# Patient Record
Sex: MALE | Race: WHITE | Employment: OTHER | URBAN - METROPOLITAN AREA
[De-identification: names, ages, dates, MRNs, and addresses within clinical notes are randomized per-mention and may not be internally consistent; named-entity substitution may affect disease eponyms.]

---

## 2021-12-25 ENCOUNTER — HOSPITAL ENCOUNTER (EMERGENCY)
Age: 71
Discharge: HOME | End: 2021-12-26
Attending: EMERGENCY MEDICINE
Payer: COMMERCIAL

## 2021-12-25 DIAGNOSIS — N17.9 ACUTE RENAL FAILURE SUPERIMPOSED ON CHRONIC KIDNEY DISEASE, UNSPECIFIED CKD STAGE, UNSPECIFIED ACUTE RENAL FAILURE TYPE (HCC): ICD-10-CM

## 2021-12-25 DIAGNOSIS — M79.10 MYALGIA: ICD-10-CM

## 2021-12-25 DIAGNOSIS — E87.6 HYPOKALEMIA: ICD-10-CM

## 2021-12-25 DIAGNOSIS — R19.7 DIARRHEA, UNSPECIFIED TYPE: Primary | ICD-10-CM

## 2021-12-25 DIAGNOSIS — R11.0 NAUSEA: ICD-10-CM

## 2021-12-25 DIAGNOSIS — E86.0 DEHYDRATION: ICD-10-CM

## 2021-12-25 DIAGNOSIS — N18.9 ACUTE RENAL FAILURE SUPERIMPOSED ON CHRONIC KIDNEY DISEASE, UNSPECIFIED CKD STAGE, UNSPECIFIED ACUTE RENAL FAILURE TYPE (HCC): ICD-10-CM

## 2021-12-25 LAB
-: NORMAL
ALBUMIN SERPL-MCNC: 4.3 G/DL (ref 3.5–5.2)
ALBUMIN/GLOBULIN RATIO: 1.6 (ref 1–2.5)
ALP BLD-CCNC: 81 U/L (ref 40–129)
ALT SERPL-CCNC: 23 U/L (ref 5–41)
ANION GAP SERPL CALCULATED.3IONS-SCNC: 15 MMOL/L (ref 9–17)
AST SERPL-CCNC: 31 U/L
BILIRUB SERPL-MCNC: 0.72 MG/DL (ref 0.3–1.2)
BUN BLDV-MCNC: 28 MG/DL (ref 8–23)
BUN/CREAT BLD: ABNORMAL (ref 9–20)
CALCIUM SERPL-MCNC: 9.2 MG/DL (ref 8.6–10.4)
CHLORIDE BLD-SCNC: 99 MMOL/L (ref 98–107)
CO2: 22 MMOL/L (ref 20–31)
CREAT SERPL-MCNC: 1.56 MG/DL (ref 0.7–1.2)
DIRECT EXAM: NORMAL
GFR AFRICAN AMERICAN: 53 ML/MIN
GFR NON-AFRICAN AMERICAN: 44 ML/MIN
GFR SERPL CREATININE-BSD FRML MDRD: ABNORMAL ML/MIN/{1.73_M2}
GFR SERPL CREATININE-BSD FRML MDRD: ABNORMAL ML/MIN/{1.73_M2}
GLUCOSE BLD-MCNC: 153 MG/DL (ref 70–99)
LIPASE: 15 U/L (ref 13–60)
Lab: NORMAL
MAGNESIUM: 1.6 MG/DL (ref 1.6–2.6)
POTASSIUM SERPL-SCNC: 3 MMOL/L (ref 3.7–5.3)
REASON FOR REJECTION: NORMAL
SODIUM BLD-SCNC: 136 MMOL/L (ref 135–144)
SPECIMEN DESCRIPTION: NORMAL
TOTAL PROTEIN: 7 G/DL (ref 6.4–8.3)
TROPONIN INTERP: ABNORMAL
TROPONIN T: ABNORMAL NG/ML
TROPONIN, HIGH SENSITIVITY: 33 NG/L (ref 0–22)
ZZ NTE CLEAN UP: ORDERED TEST: NORMAL
ZZ NTE WITH NAME CLEAN UP: SPECIMEN SOURCE: NORMAL

## 2021-12-25 PROCEDURE — 83690 ASSAY OF LIPASE: CPT

## 2021-12-25 PROCEDURE — 96365 THER/PROPH/DIAG IV INF INIT: CPT

## 2021-12-25 PROCEDURE — 6360000002 HC RX W HCPCS: Performed by: EMERGENCY MEDICINE

## 2021-12-25 PROCEDURE — 85025 COMPLETE CBC W/AUTO DIFF WBC: CPT

## 2021-12-25 PROCEDURE — 84484 ASSAY OF TROPONIN QUANT: CPT

## 2021-12-25 PROCEDURE — 93005 ELECTROCARDIOGRAM TRACING: CPT | Performed by: EMERGENCY MEDICINE

## 2021-12-25 PROCEDURE — 83735 ASSAY OF MAGNESIUM: CPT

## 2021-12-25 PROCEDURE — 87804 INFLUENZA ASSAY W/OPTIC: CPT

## 2021-12-25 PROCEDURE — 80053 COMPREHEN METABOLIC PANEL: CPT

## 2021-12-25 PROCEDURE — 99283 EMERGENCY DEPT VISIT LOW MDM: CPT

## 2021-12-25 PROCEDURE — 36415 COLL VENOUS BLD VENIPUNCTURE: CPT

## 2021-12-25 PROCEDURE — U0005 INFEC AGEN DETEC AMPLI PROBE: HCPCS

## 2021-12-25 PROCEDURE — 96366 THER/PROPH/DIAG IV INF ADDON: CPT

## 2021-12-25 PROCEDURE — U0003 INFECTIOUS AGENT DETECTION BY NUCLEIC ACID (DNA OR RNA); SEVERE ACUTE RESPIRATORY SYNDROME CORONAVIRUS 2 (SARS-COV-2) (CORONAVIRUS DISEASE [COVID-19]), AMPLIFIED PROBE TECHNIQUE, MAKING USE OF HIGH THROUGHPUT TECHNOLOGIES AS DESCRIBED BY CMS-2020-01-R: HCPCS

## 2021-12-25 PROCEDURE — 2580000003 HC RX 258: Performed by: EMERGENCY MEDICINE

## 2021-12-25 PROCEDURE — 96375 TX/PRO/DX INJ NEW DRUG ADDON: CPT

## 2021-12-25 RX ORDER — ONDANSETRON 2 MG/ML
4 INJECTION INTRAMUSCULAR; INTRAVENOUS ONCE
Status: COMPLETED | OUTPATIENT
Start: 2021-12-25 | End: 2021-12-25

## 2021-12-25 RX ORDER — KETOROLAC TROMETHAMINE 15 MG/ML
15 INJECTION, SOLUTION INTRAMUSCULAR; INTRAVENOUS ONCE
Status: COMPLETED | OUTPATIENT
Start: 2021-12-25 | End: 2021-12-25

## 2021-12-25 RX ORDER — HYDROCHLOROTHIAZIDE 25 MG/1
25 TABLET ORAL DAILY
COMMUNITY

## 2021-12-25 RX ORDER — ASPIRIN 81 MG/1
81 TABLET, CHEWABLE ORAL DAILY
COMMUNITY

## 2021-12-25 RX ORDER — POTASSIUM CITRATE 10 MEQ/1
TABLET, EXTENDED RELEASE ORAL
COMMUNITY

## 2021-12-25 RX ORDER — METOPROLOL SUCCINATE 25 MG/1
25 TABLET, EXTENDED RELEASE ORAL DAILY
COMMUNITY

## 2021-12-25 RX ORDER — ATORVASTATIN CALCIUM 40 MG/1
40 TABLET, FILM COATED ORAL DAILY
COMMUNITY

## 2021-12-25 RX ORDER — LOSARTAN POTASSIUM 100 MG/1
100 TABLET ORAL DAILY
COMMUNITY

## 2021-12-25 RX ORDER — MAGNESIUM SULFATE IN WATER 40 MG/ML
2000 INJECTION, SOLUTION INTRAVENOUS ONCE
Status: COMPLETED | OUTPATIENT
Start: 2021-12-25 | End: 2021-12-26

## 2021-12-25 RX ORDER — TADALAFIL 5 MG/1
5 TABLET ORAL PRN
COMMUNITY

## 2021-12-25 RX ORDER — 0.9 % SODIUM CHLORIDE 0.9 %
1000 INTRAVENOUS SOLUTION INTRAVENOUS ONCE
Status: COMPLETED | OUTPATIENT
Start: 2021-12-25 | End: 2021-12-26

## 2021-12-25 RX ADMIN — SODIUM CHLORIDE 1000 ML: 9 INJECTION, SOLUTION INTRAVENOUS at 23:30

## 2021-12-25 RX ADMIN — ONDANSETRON 4 MG: 2 INJECTION INTRAMUSCULAR; INTRAVENOUS at 23:30

## 2021-12-25 RX ADMIN — MAGNESIUM SULFATE 2000 MG: 2 INJECTION INTRAVENOUS at 23:49

## 2021-12-25 RX ADMIN — KETOROLAC TROMETHAMINE 15 MG: 15 INJECTION, SOLUTION INTRAMUSCULAR; INTRAVENOUS at 23:30

## 2021-12-25 ASSESSMENT — PAIN SCALES - GENERAL: PAINLEVEL_OUTOF10: 7

## 2021-12-26 VITALS
OXYGEN SATURATION: 95 % | SYSTOLIC BLOOD PRESSURE: 140 MMHG | RESPIRATION RATE: 18 BRPM | TEMPERATURE: 99.1 F | HEART RATE: 90 BPM | DIASTOLIC BLOOD PRESSURE: 95 MMHG

## 2021-12-26 LAB
ABSOLUTE EOS #: 0 K/UL (ref 0–0.4)
ABSOLUTE IMMATURE GRANULOCYTE: ABNORMAL K/UL (ref 0–0.3)
ABSOLUTE LYMPH #: 0.69 K/UL (ref 1–4.8)
ABSOLUTE MONO #: 0.77 K/UL (ref 0.1–0.8)
ANION GAP SERPL CALCULATED.3IONS-SCNC: 12 MMOL/L (ref 9–17)
BASOPHILS # BLD: 2 % (ref 0–2)
BASOPHILS ABSOLUTE: 0.15 K/UL (ref 0–0.2)
BUN BLDV-MCNC: 27 MG/DL (ref 8–23)
BUN/CREAT BLD: ABNORMAL (ref 9–20)
CALCIUM SERPL-MCNC: 8.4 MG/DL (ref 8.6–10.4)
CHLORIDE BLD-SCNC: 101 MMOL/L (ref 98–107)
CO2: 21 MMOL/L (ref 20–31)
CREAT SERPL-MCNC: 1.41 MG/DL (ref 0.7–1.2)
DIFFERENTIAL TYPE: ABNORMAL
EOSINOPHILS RELATIVE PERCENT: 0 % (ref 1–4)
GFR AFRICAN AMERICAN: >60 ML/MIN
GFR NON-AFRICAN AMERICAN: 50 ML/MIN
GFR SERPL CREATININE-BSD FRML MDRD: ABNORMAL ML/MIN/{1.73_M2}
GFR SERPL CREATININE-BSD FRML MDRD: ABNORMAL ML/MIN/{1.73_M2}
GLUCOSE BLD-MCNC: 148 MG/DL (ref 70–99)
HCT VFR BLD CALC: 43.8 % (ref 41–53)
HEMOGLOBIN: 15.3 G/DL (ref 13.5–17.5)
IMMATURE GRANULOCYTES: ABNORMAL %
LYMPHOCYTES # BLD: 9 % (ref 24–44)
MCH RBC QN AUTO: 31.6 PG (ref 26–34)
MCHC RBC AUTO-ENTMCNC: 34.8 G/DL (ref 31–37)
MCV RBC AUTO: 90.8 FL (ref 80–100)
MONOCYTES # BLD: 10 % (ref 1–7)
MORPHOLOGY: NORMAL
NRBC AUTOMATED: ABNORMAL PER 100 WBC
PDW BLD-RTO: 13.1 % (ref 12.5–15.4)
PLATELET # BLD: 211 K/UL (ref 140–450)
PLATELET ESTIMATE: ABNORMAL
PMV BLD AUTO: 8.3 FL (ref 6–12)
POTASSIUM SERPL-SCNC: 3.1 MMOL/L (ref 3.7–5.3)
RBC # BLD: 4.83 M/UL (ref 4.5–5.9)
RBC # BLD: ABNORMAL 10*6/UL
SEG NEUTROPHILS: 79 % (ref 36–66)
SEGMENTED NEUTROPHILS ABSOLUTE COUNT: 6.09 K/UL (ref 1.8–7.7)
SODIUM BLD-SCNC: 134 MMOL/L (ref 135–144)
TROPONIN INTERP: ABNORMAL
TROPONIN T: ABNORMAL NG/ML
TROPONIN, HIGH SENSITIVITY: 31 NG/L (ref 0–22)
WBC # BLD: 7.7 K/UL (ref 3.5–11)
WBC # BLD: ABNORMAL 10*3/UL

## 2021-12-26 PROCEDURE — 36415 COLL VENOUS BLD VENIPUNCTURE: CPT

## 2021-12-26 PROCEDURE — 84484 ASSAY OF TROPONIN QUANT: CPT

## 2021-12-26 PROCEDURE — 80048 BASIC METABOLIC PNL TOTAL CA: CPT

## 2021-12-26 PROCEDURE — 93005 ELECTROCARDIOGRAM TRACING: CPT | Performed by: EMERGENCY MEDICINE

## 2021-12-26 PROCEDURE — 6370000000 HC RX 637 (ALT 250 FOR IP): Performed by: EMERGENCY MEDICINE

## 2021-12-26 RX ORDER — POTASSIUM CHLORIDE 20 MEQ/1
40 TABLET, EXTENDED RELEASE ORAL ONCE
Status: COMPLETED | OUTPATIENT
Start: 2021-12-26 | End: 2021-12-26

## 2021-12-26 RX ORDER — ONDANSETRON 4 MG/1
4 TABLET, ORALLY DISINTEGRATING ORAL EVERY 8 HOURS PRN
Qty: 10 TABLET | Refills: 0 | Status: SHIPPED | OUTPATIENT
Start: 2021-12-26

## 2021-12-26 RX ADMIN — POTASSIUM CHLORIDE 40 MEQ: 1500 TABLET, EXTENDED RELEASE ORAL at 00:11

## 2021-12-26 RX ADMIN — POTASSIUM CHLORIDE 40 MEQ: 1500 TABLET, EXTENDED RELEASE ORAL at 02:14

## 2021-12-26 NOTE — ED PROVIDER NOTES
[id number] [facility], [facility] ED  [address] [address] [facility] [address] [facility] [address]  Phone: [phone]  Fax: [phone]      Pt Name: Beatrice Clark  [id number]  [place] 1950  Date of evaluation: 12/25/2021      CHIEF COMPLAINT       Chief Complaint   Patient presents with    Diarrhea     x2 days       HISTORY OF PRESENT ILLNESS   Beatrice Clark is a 70 y.o. male with history of CAD, hypertension, hyperlipidemia, prostate cancer status post prostate surgery in remission, knee replacement, and 2 back surgeries who presents for evaluation of flulike symptoms. The patient reports that starting yesterday afternoon he developed gradual onset, constant, progressive, fatigue, malaise, nausea, chills, myalgias, and nonbloody watery diarrhea with over 20 episodes. The patient has been taking Advil and Imodium without improvement. He does not list any provoking or palliating factors. He states that he has received his full Covid vaccination with a booster on 11/10/2021. He also received his flu vaccination in September 2021. The patient denies any sick contacts. He is currently visiting from out of town. He denies fever, chills, headache, vision changes, neck pain, back pain, chest pain, shortness of breath, cough, sore throat, focal weakness, numbness, tingling, vomiting, recent injury or illness. REVIEW OF SYSTEMS     Ten point review of systems was reviewed and is negative unless otherwise noted in the HPI    [address]    has a past medical history of CAD (coronary artery disease), Gall stones, H/O discectomy, H/O laminectomy, Heart attack ([address] History of knee replacement, Hyperlipidemia, Hypertension, Prostate cancer ([address] and Stenosis (acquired) of bladder neck or vesicourethral orifice. SURGICAL HISTORY      has a past surgical history that includes Prostate surgery; knee surgery; and laminectomy.     CURRENT MEDICATIONS       Previous Medications    ASPIRIN 81 MG CHEWABLE TABLET    Take 81 mg by mouth daily    ATORVASTATIN (LIPITOR) 40 MG TABLET    Take 40 mg by mouth daily    HYDROCHLOROTHIAZIDE (HYDRODIURIL) 25 MG TABLET    Take 25 mg by mouth daily    LOSARTAN (COZAAR) 100 MG TABLET    Take 100 mg by mouth daily    METOPROLOL SUCCINATE (TOPROL XL) 25 MG EXTENDED RELEASE TABLET    Take 25 mg by mouth daily    POTASSIUM CITRATE (UROCIT-K) 10 MEQ (1080 MG) EXTENDED RELEASE TABLET    Take by mouth 3 times daily (with meals)    TADALAFIL (CIALIS) 5 MG TABLET    Take 5 mg by mouth as needed for Erectile Dysfunction       ALLERGIES     has No Known Allergies. FAMILY HISTORY     has no family status information on file. family history is not on file. SOCIAL HISTORY      reports that he has never smoked. He has never used smokeless tobacco. He reports that he does not drink alcohol. PHYSICAL EXAM     INITIAL VITALS:  oral temperature is 99.1 °F (37.3 °C). His blood pressure is 140/95 (abnormal) and his pulse is 90. His respiration is 18 and oxygen saturation is 95%. CONSTITUTIONAL: no apparent distress, well appearing  SKIN: warm, dry, no jaundice, hives or petechiae  EYES: clear conjunctiva, non-icteric sclera  HENT: normocephalic, atraumatic, dry mucus membranes, Posterior oropharynx is clear without any erythema, edema, exudate or asymmetry. Uvula midline. No trismus or malocclusion. No pain to palpation over the frontal or maxillary sinuses. No mastoid tenderness. Able to handle secretions. Normal speech. Patent airway. No submental swelling or evidence of cellulitis. NECK: Nontender and supple with no nuchal rigidity, full range of motion  PULMONARY: clear to auscultation without wheezes, rhonchi, or rales, normal excursion, no accessory muscle use and no stridor  CARDIOVASCULAR: regular rate, rhythm. Strong radial pulses with intact distal perfusion. Capillary refill <2 seconds.   GASTROINTESTINAL: soft, non-tender, no pain or McBurney's point, negative Johnson sign, no mg/dL    Bun/Cre Ratio NOT REPORTED 9 - 20    Calcium 9.2 8.6 - 10.4 mg/dL    Sodium 136 135 - 144 mmol/L    Potassium 3.0 (L) 3.7 - 5.3 mmol/L    Chloride 99 98 - 107 mmol/L    CO2 22 20 - 31 mmol/L    Anion Gap 15 9 - 17 mmol/L    Alkaline Phosphatase 81 40 - 129 U/L    ALT 23 5 - 41 U/L    AST 31 <40 U/L    Total Bilirubin 0.72 0.3 - 1.2 mg/dL    Total Protein 7.0 6.4 - 8.3 g/dL    Albumin 4.3 3.5 - 5.2 g/dL    Albumin/Globulin Ratio 1.6 1.0 - 2.5    GFR Non- 44 (L) >60 mL/min    GFR  53 (L) >60 mL/min    GFR Comment          GFR Staging NOT REPORTED    Lipase   Result Value Ref Range    Lipase 15 13 - 60 U/L   Troponin   Result Value Ref Range    Troponin, High Sensitivity 33 (H) 0 - 22 ng/L    Troponin T NOT REPORTED <0.03 ng/mL    Troponin Interp NOT REPORTED    SPECIMEN REJECTION   Result Value Ref Range    Specimen Source . BLOOD     Ordered Test CDP     Reason for Rejection Unable to perform testing: Specimen clotted.      - NOT REPORTED    CBC Auto Differential   Result Value Ref Range    WBC 7.7 3.5 - 11.0 k/uL    RBC 4.83 4.5 - 5.9 m/uL    Hemoglobin 15.3 13.5 - 17.5 g/dL    Hematocrit 43.8 41 - 53 %    MCV 90.8 80 - 100 fL    MCH 31.6 26 - 34 pg    MCHC 34.8 31 - 37 g/dL    RDW 13.1 12.5 - 15.4 %    Platelets 331 108 - 331 k/uL    MPV 8.3 6.0 - 12.0 fL    NRBC Automated NOT REPORTED per 100 WBC    Differential Type NOT REPORTED     Immature Granulocytes NOT REPORTED 0 %    Absolute Immature Granulocyte NOT REPORTED 0.00 - 0.30 k/uL    WBC Morphology NOT REPORTED     RBC Morphology NOT REPORTED     Platelet Estimate NOT REPORTED     Seg Neutrophils 79 (H) 36 - 66 %    Lymphocytes 9 (L) 24 - 44 %    Monocytes 10 (H) 1 - 7 %    Eosinophils % 0 (L) 1 - 4 %    Basophils 2 0 - 2 %    Segs Absolute 6.09 1.8 - 7.7 k/uL    Absolute Lymph # 0.69 (L) 1.0 - 4.8 k/uL    Absolute Mono # 0.77 0.1 - 0.8 k/uL    Absolute Eos # 0.00 0.0 - 0.4 k/uL    Basophils Absolute 0.15 0.0 - 0.2 k/uL Morphology Normal    Magnesium   Result Value Ref Range    Magnesium 1.6 1.6 - 2.6 mg/dL   Troponin   Result Value Ref Range    Troponin, High Sensitivity 31 (H) 0 - 22 ng/L    Troponin T NOT REPORTED <0.03 ng/mL    Troponin Interp NOT REPORTED    Basic Metabolic Panel   Result Value Ref Range    Glucose 148 (H) 70 - 99 mg/dL    BUN 27 (H) 8 - 23 mg/dL    CREATININE 1.41 (H) 0.70 - 1.20 mg/dL    Bun/Cre Ratio NOT REPORTED 9 - 20    Calcium 8.4 (L) 8.6 - 10.4 mg/dL    Sodium 134 (L) 135 - 144 mmol/L    Potassium 3.1 (L) 3.7 - 5.3 mmol/L    Chloride 101 98 - 107 mmol/L    CO2 21 20 - 31 mmol/L    Anion Gap 12 9 - 17 mmol/L    GFR Non-African American 50 (L) >60 mL/min    GFR African American >60 >60 mL/min    GFR Comment          GFR Staging NOT REPORTED        EMERGENCY DEPARTMENT COURSE:        The patient was given the following medications:  Orders Placed This Encounter   Medications    0.9 % sodium chloride bolus    ketorolac (TORADOL) injection 15 mg    ondansetron (ZOFRAN) injection 4 mg    magnesium sulfate 2000 mg in 50 mL IVPB premix    potassium chloride (KLOR-CON M) extended release tablet 40 mEq    potassium chloride (KLOR-CON M) extended release tablet 40 mEq    ondansetron (ZOFRAN ODT) 4 MG disintegrating tablet     Sig: Take 1 tablet by mouth every 8 hours as needed for Nausea     Dispense:  10 tablet     Refill:  0        Vitals:    Vitals:    12/25/21 2222 12/26/21 0155   BP: (!) 140/95    Pulse: 107 90   Resp: 18    Temp: 99.1 °F (37.3 °C)    TempSrc: Oral    SpO2: 95%      -------------------------  BP: (!) 140/95, Temp: 99.1 °F (37.3 °C), Pulse: 90, Resp: 18    CONSULTS:  None    CRITICAL CARE:   None    PROCEDURES:  None    DIAGNOSIS/ MDM:   Jeri Weller is a 70 y.o. male who presents with nausea, diarrhea, and generalized myalgias. The patient arrives with slight tachycardia but I suspect this is secondary to dehydration. Mucous membranes are dry but exam is otherwise unremarkable.   He was treated with IV fluids and heart rate returned to normal.  Initial EKG showed prolonged QTc interval which was treated with 2 g of magnesium. On repeat EKG the QTc interval had returned to normal.  No ST elevations or depressions. He has never had any chest pain or shortness of breath. His symptoms improved with Toradol, Zofran and IV fluids. Initial troponin is elevated at 33 and he is from out of town and I do not have any previous troponins to compare to. Initial BMP reveals acute on chronic kidney injury with BUN of 28 and creatinine of 1.56. The patient also has hypokalemia of 3.0 which was replaced orally. CBC, LFTs and lipase are unremarkable. Rapid flu is negative. Covid was a send out. Repeat troponin trending down to 31. Repeat potassium was 3.1 he was given more oral potassium prior to discharge. I reevaluated the patient and he is feeling much improved. He is requesting discharge. I suspect his troponins were elevated secondary to his acute on chronic kidney injury. I was able to look up old labs and his baseline creatinine is 1.29. We discussed admission versus discharge and the patient would like to be discharged with follow-up with his primary care doctor and cardiologist in [facility]. I have low suspicion for ACS, PE, dissection, esophageal rupture, cardiac tamponade, pneumothorax, or sepsis. The patient was instructed to continually hydrate and to take Tylenol as needed for pain or fever. He was instructed to return to the ER for worsening symptoms or any other concern. The patient understands that at this time there is no evidence for a more malignant underlying process, but also understands that early in the process of an illness or injury, an emergency department work-up can be falsely reassuring.   Routine discharge counseling was given, and the patient understands that worsening, changing or persistent symptoms should prompt a immediate call or follow-up with their primary care physician or return to the emergency department. The importance of appropriate follow-up was also discussed. I have reviewed the disposition diagnosis with the patient. I have answered their questions and given discharge instructions. They voiced understanding of these instructions and did not have any further questions or complaints. FINAL IMPRESSION      1. Diarrhea, unspecified type    2. Nausea    3. Myalgia    4. Hypokalemia    5. Acute renal failure superimposed on chronic kidney disease, unspecified CKD stage, unspecified acute renal failure type ([facility] [address]    6. Dehydration          DISPOSITION/PLAN   DISPOSITION          PATIENT REFERRED TO:  MD Stef Barrett  50[address]  [phone]    Schedule an appointment as soon as possible for a visit in 1 day      Your cardiologist    Schedule an appointment as soon as possible for a visit in 1 day      [facility] ED  [address] [facility].   [address] [place] [address]  [phone]  Go to   If symptoms worsen      DISCHARGE MEDICATIONS:  New Prescriptions    ONDANSETRON (ZOFRAN ODT) 4 MG DISINTEGRATING TABLET    Take 1 tablet by mouth every 8 hours as needed for Nausea       (Please note that portions of this note were completed with a voice recognitionprogram.  Efforts were made to edit the dictations but occasionally words are mis-transcribed.)    Martín Cesar DO, [facility]  Emergency Physician Attending         Martín Cesar DO  12/26/21 [id number]

## 2021-12-27 ENCOUNTER — CARE COORDINATION (OUTPATIENT)
Dept: CARE COORDINATION | Age: 71
End: 2021-12-27

## 2021-12-27 LAB
EKG ATRIAL RATE: 78 BPM
EKG ATRIAL RATE: 97 BPM
EKG P AXIS: 24 DEGREES
EKG P AXIS: 5 DEGREES
EKG P-R INTERVAL: 202 MS
EKG P-R INTERVAL: 208 MS
EKG Q-T INTERVAL: 400 MS
EKG Q-T INTERVAL: 410 MS
EKG QRS DURATION: 94 MS
EKG QRS DURATION: 96 MS
EKG QTC CALCULATION (BAZETT): 456 MS
EKG QTC CALCULATION (BAZETT): 520 MS
EKG R AXIS: -30 DEGREES
EKG R AXIS: -36 DEGREES
EKG T AXIS: -18 DEGREES
EKG T AXIS: 4 DEGREES
EKG VENTRICULAR RATE: 78 BPM
EKG VENTRICULAR RATE: 97 BPM
SARS-COV-2: NORMAL
SARS-COV-2: NOT DETECTED
SOURCE: NORMAL

## 2024-09-23 ENCOUNTER — APPOINTMENT (OUTPATIENT)
Dept: PODIATRY | Facility: CLINIC | Age: 74
End: 2024-09-23
Payer: MEDICARE

## 2024-09-23 DIAGNOSIS — L85.3 XEROSIS CUTIS: ICD-10-CM

## 2024-09-23 DIAGNOSIS — M79.671 FOOT PAIN, RIGHT: Primary | ICD-10-CM

## 2024-09-23 DIAGNOSIS — L60.3 NAIL DYSTROPHY: ICD-10-CM

## 2024-09-23 PROCEDURE — 1159F MED LIST DOCD IN RCRD: CPT | Performed by: PODIATRIST

## 2024-09-23 PROCEDURE — 99204 OFFICE O/P NEW MOD 45 MIN: CPT | Performed by: PODIATRIST

## 2024-09-23 PROCEDURE — 1036F TOBACCO NON-USER: CPT | Performed by: PODIATRIST

## 2024-09-23 PROCEDURE — 1160F RVW MEDS BY RX/DR IN RCRD: CPT | Performed by: PODIATRIST

## 2024-09-23 PROCEDURE — 99214 OFFICE O/P EST MOD 30 MIN: CPT | Performed by: PODIATRIST

## 2024-09-23 RX ORDER — LOSARTAN POTASSIUM 100 MG/1
100 TABLET ORAL
COMMUNITY
Start: 2024-05-16

## 2024-09-23 RX ORDER — HYDROCHLOROTHIAZIDE 25 MG/1
25 TABLET ORAL
COMMUNITY
Start: 2024-05-16

## 2024-09-23 RX ORDER — TROSPIUM CHLORIDE 20 MG/1
20 TABLET, FILM COATED ORAL EVERY 12 HOURS PRN
COMMUNITY
Start: 2024-02-01

## 2024-09-23 RX ORDER — POTASSIUM CITRATE 10 MEQ/1
TABLET, EXTENDED RELEASE ORAL
COMMUNITY

## 2024-09-23 RX ORDER — ATORVASTATIN CALCIUM 40 MG/1
40 TABLET, FILM COATED ORAL DAILY
COMMUNITY

## 2024-09-23 RX ORDER — NAPROXEN SODIUM 220 MG/1
81 TABLET, FILM COATED ORAL DAILY
COMMUNITY

## 2024-09-23 RX ORDER — LEVOFLOXACIN 500 MG/1
500 TABLET, FILM COATED ORAL
COMMUNITY
Start: 2024-09-18 | End: 2024-09-24

## 2024-09-23 RX ORDER — METRONIDAZOLE 500 MG/1
500 TABLET ORAL 3 TIMES DAILY
COMMUNITY
Start: 2024-09-18 | End: 2024-09-24

## 2024-09-23 RX ORDER — TADALAFIL 5 MG/1
5 TABLET ORAL DAILY
COMMUNITY

## 2024-09-23 RX ORDER — EZETIMIBE 10 MG/1
10 TABLET ORAL
COMMUNITY
Start: 2024-05-16

## 2024-09-23 RX ORDER — CHOLECALCIFEROL (VITAMIN D3) 25 MCG
1000 TABLET ORAL
COMMUNITY

## 2024-09-23 RX ORDER — MELOXICAM 15 MG/1
1 TABLET ORAL DAILY
COMMUNITY
Start: 2020-09-09

## 2024-09-23 RX ORDER — METOPROLOL SUCCINATE 25 MG/1
25 TABLET, EXTENDED RELEASE ORAL
COMMUNITY
Start: 2024-05-16

## 2024-09-23 NOTE — PROGRESS NOTES
Chief Complaint   Patient presents with    fungal nail     New Patient is here today with a fungal nail 3rd toe right foot. Went to dermatologist and was RX Penlac to apply.         Chief complaint of toe 3 right foot 1.5 months some pain with the nail and discolored area.  Was seen by his dermatologist.  Dermatology prescribed Penlac past week or so, redness has resolved and pain also has resolved.  However portion of the nail plate is starting to lift off and he pulled it off.  No known trauma.  First occurrence.  Also recent soleus muscle problem with the right lower extremity.  This is resolved with physical therapy.  Diverticulitis.  History of back surgery with some numbness in the feet.  Prostate cancer.   Retired teacher.   Non smoker.     General/Constitutional: Alert. NAD.   Respiratory: Non labored breathing.   Psychiatric: Mood and affect normal/baseline.   HEENT: Sclera clear. Wearing corrective lenses.  Dermatologic: Left great toenail is with white onychomycosis.  The other nails are essentially normal on the left foot.  Right foot nails are essentially normal except third digit.  Portion of the nail plate is missing.  Minimal residual inflammation around the nail border.  No onychocryptosis noted.  Nail is dystrophic.  Discoloration without any subungual debris.  Nail is well attached otherwise.  No other no acute inflammatory infectious process. Web spaces are dry. No ulcers no pre-ulcerative areas.  Mild xerosis cutis.  No tinea pedis noted.  Vascular: Pedal pulses are intact and symmetric including the dorsalis pedis and the posterior tibial pulses. Feet are warm to touch. No swelling appreciated either extremity.  Neurological: Alert and oriented. No acute distress. No sensory impairment bilateral.  Filament testing is normal.  Musculoskeletal: Strength is normal for age. No acute deficits appreciated.    Impression: Dystrophic nail and xerosis cutis.    -At this point recommend stopping the  Penlac to see how the nail continues to grow.  I think the Penlac can potentially cause inflammatory problems now that portion of the nail bed is exposed.  Will see how it regrows in the next couple of months.  Can consider removal of the nail plate.  At this point there is nothing to culture.  No subungual debris is noted    -Counseled patient on proper footcare.    -If there is any further problems before your next visit please feel free to send me a message and/or images or to call.

## 2024-11-25 ENCOUNTER — APPOINTMENT (OUTPATIENT)
Dept: PODIATRY | Facility: CLINIC | Age: 74
End: 2024-11-25
Payer: MEDICARE

## 2025-06-16 ENCOUNTER — OFFICE VISIT (OUTPATIENT)
Dept: ORTHOPEDIC SURGERY | Facility: HOSPITAL | Age: 75
End: 2025-06-16
Payer: MEDICARE

## 2025-06-16 ENCOUNTER — HOSPITAL ENCOUNTER (OUTPATIENT)
Dept: RADIOLOGY | Facility: HOSPITAL | Age: 75
Discharge: HOME | End: 2025-06-16
Payer: MEDICARE

## 2025-06-16 VITALS — HEIGHT: 65 IN | WEIGHT: 176.37 LBS | BODY MASS INDEX: 29.38 KG/M2

## 2025-06-16 DIAGNOSIS — M25.511 RIGHT SHOULDER PAIN, UNSPECIFIED CHRONICITY: ICD-10-CM

## 2025-06-16 PROCEDURE — 1125F AMNT PAIN NOTED PAIN PRSNT: CPT | Performed by: ORTHOPAEDIC SURGERY

## 2025-06-16 PROCEDURE — 3008F BODY MASS INDEX DOCD: CPT | Performed by: ORTHOPAEDIC SURGERY

## 2025-06-16 PROCEDURE — 73030 X-RAY EXAM OF SHOULDER: CPT | Mod: RT

## 2025-06-16 PROCEDURE — 1159F MED LIST DOCD IN RCRD: CPT | Performed by: ORTHOPAEDIC SURGERY

## 2025-06-16 PROCEDURE — 73030 X-RAY EXAM OF SHOULDER: CPT | Mod: RIGHT SIDE | Performed by: RADIOLOGY

## 2025-06-16 PROCEDURE — 99202 OFFICE O/P NEW SF 15 MIN: CPT | Performed by: ORTHOPAEDIC SURGERY

## 2025-06-16 PROCEDURE — 99204 OFFICE O/P NEW MOD 45 MIN: CPT | Performed by: ORTHOPAEDIC SURGERY

## 2025-06-16 ASSESSMENT — PAIN - FUNCTIONAL ASSESSMENT: PAIN_FUNCTIONAL_ASSESSMENT: 0-10

## 2025-06-16 ASSESSMENT — PAIN SCALES - GENERAL: PAINLEVEL_OUTOF10: 3

## 2025-06-16 NOTE — PROGRESS NOTES
Patient is here for evaluation of his right shoulder injuries right shoulder golfing he took a drive felt sudden pain in the anterior aspect of the shoulder and is persisting particular with extension he may have had a minor injury to his shoulder when he fell getting out of a restaurant [place] several months ago and has had some weakness and external rotation which he has been working on on his own.    The patient is pleasant and cooperative.  The patient is alert and oriented ×3.  Auditory function is intact.  The patient is a good historian.  The patient is not in acute distress.  Eye exam significant for nonicteric sclera, intact ocular muscle movement.  Breathing is rhythmic symmetric and nonlabored.  Right radial pulse palpable brisk capillary refill light touch sensation intact no peripheral edema  strength 5/5 power in external rotation 4/5 internal rotation abduction 5/there is slight pain with abduction against resistance biceps contraction is 5/5 with normal contour.  No apprehension.    Radiographs demonstrate very mild arthritic degenerative changes of the glenohumeral joint subacromial space is well-preserved.  Right shoulder strain injury possible chronic rotator cuff tear.  We discussed options for treatment and I have recommended external rotation strengthening, Reduction of intensity of golf for 10 days to 2 weeks and repeat examination.    This was dictated using voice recognition software and not corrected for grammatical or spelling errors.

## 2025-06-25 ENCOUNTER — APPOINTMENT (OUTPATIENT)
Dept: ORTHOPEDIC SURGERY | Facility: HOSPITAL | Age: 75
End: 2025-06-25
Payer: MEDICARE